# Patient Record
Sex: FEMALE | Race: WHITE | NOT HISPANIC OR LATINO | Employment: FULL TIME | URBAN - NONMETROPOLITAN AREA
[De-identification: names, ages, dates, MRNs, and addresses within clinical notes are randomized per-mention and may not be internally consistent; named-entity substitution may affect disease eponyms.]

---

## 2021-09-08 ENCOUNTER — OFFICE VISIT (OUTPATIENT)
Dept: INTERNAL MEDICINE | Facility: CLINIC | Age: 31
End: 2021-09-08

## 2021-09-08 ENCOUNTER — OFFICE VISIT (OUTPATIENT)
Dept: OBSTETRICS AND GYNECOLOGY | Facility: CLINIC | Age: 31
End: 2021-09-08

## 2021-09-08 VITALS
HEIGHT: 66 IN | BODY MASS INDEX: 24.43 KG/M2 | DIASTOLIC BLOOD PRESSURE: 64 MMHG | WEIGHT: 152 LBS | SYSTOLIC BLOOD PRESSURE: 112 MMHG

## 2021-09-08 VITALS
DIASTOLIC BLOOD PRESSURE: 81 MMHG | WEIGHT: 147 LBS | SYSTOLIC BLOOD PRESSURE: 114 MMHG | HEIGHT: 55 IN | TEMPERATURE: 97.7 F | HEART RATE: 100 BPM | BODY MASS INDEX: 34.02 KG/M2 | OXYGEN SATURATION: 100 %

## 2021-09-08 DIAGNOSIS — L70.0 ACNE VULGARIS: ICD-10-CM

## 2021-09-08 DIAGNOSIS — Z30.8 ENCOUNTER FOR OTHER CONTRACEPTIVE MANAGEMENT: ICD-10-CM

## 2021-09-08 DIAGNOSIS — Z13.0 SCREENING FOR BLOOD DISEASE: ICD-10-CM

## 2021-09-08 DIAGNOSIS — G47.9 SLEEP DISTURBANCE: ICD-10-CM

## 2021-09-08 DIAGNOSIS — Z12.39 ENCOUNTER FOR BREAST CANCER SCREENING OTHER THAN MAMMOGRAM: ICD-10-CM

## 2021-09-08 DIAGNOSIS — Z13.29 SCREENING FOR ENDOCRINE DISORDER: ICD-10-CM

## 2021-09-08 DIAGNOSIS — Z01.419 ENCOUNTER FOR GYNECOLOGICAL EXAMINATION WITHOUT ABNORMAL FINDING: Primary | ICD-10-CM

## 2021-09-08 DIAGNOSIS — R23.2 HOT FLASHES: ICD-10-CM

## 2021-09-08 DIAGNOSIS — L30.9 DERMATITIS: Primary | ICD-10-CM

## 2021-09-08 DIAGNOSIS — Z31.69 ENCOUNTER FOR PRECONCEPTION CONSULTATION: ICD-10-CM

## 2021-09-08 DIAGNOSIS — Z13.220 LIPID SCREENING: ICD-10-CM

## 2021-09-08 PROCEDURE — 99385 PREV VISIT NEW AGE 18-39: CPT | Performed by: OBSTETRICS & GYNECOLOGY

## 2021-09-08 PROCEDURE — 99204 OFFICE O/P NEW MOD 45 MIN: CPT | Performed by: FAMILY MEDICINE

## 2021-09-08 PROCEDURE — 99214 OFFICE O/P EST MOD 30 MIN: CPT | Performed by: OBSTETRICS & GYNECOLOGY

## 2021-09-08 RX ORDER — DOXYCYCLINE HYCLATE 50 MG/1
50 CAPSULE ORAL DAILY
COMMUNITY
Start: 2021-08-17

## 2021-09-08 RX ORDER — NORGESTIMATE AND ETHINYL ESTRADIOL 0.25-0.035
1 KIT ORAL DAILY
COMMUNITY
Start: 2021-08-19 | End: 2021-09-08 | Stop reason: SDUPTHER

## 2021-09-08 NOTE — PROGRESS NOTES
Niyah Santizo is a 31 y.o. female.    Chief Complaint   Patient presents with   • Establish Care     thyroid check       HPI   Patient presents today to establish care.  She has not had a primary care physician in quite some time.  She does see gynecology, who writes her oral contraceptive.  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  Patient reports concern with thyroid disorder.  She reports her mother has thyroid disorder.  She reports hormonal acne and mood has improved over the years.  She reports hot flashes during the day.  She sweats frequently.  Sleeps with a fan on. Denies palpitations randomly, sometimes with anxiety.  Not overly fatigued.  Wanting to come off oral contraceptives.  Going to discuss with gynecology.  Does not sleep throughout the night.  She does get overheated.  She wakes up every couple of hours.  No trouble falling asleep.  She is able to go back to sleep.  This has always been an issue for her.  She reports a skin abnormality off and on.  She states it can be almost like an allergic reaction to chest, backs of knees.  Will occasionally have rash to knuckles, fingers, knees, elbows.  This has occurred since 2017.      Not had tdap in the last 10 years.  Not had COVID vaccine.  Concerned with infertility.  Will have pap later today.     The following portions of the patient's history were reviewed and updated as appropriate: allergies, current medications, past family history, past medical history, past social history, past surgical history and problem list.     Past Medical History:   Diagnosis Date   • Allergic    • Asthma    • Depression        Past Surgical History:   Procedure Laterality Date   • HERNIA REPAIR  1997    inguinal       Family History   Problem Relation Age of Onset   • Hypertension Father    • Arthritis Other    • Thyroid disease Mother         low       Social History     Socioeconomic History   • Marital  status: Single     Spouse name: Not on file   • Number of children: Not on file   • Years of education: Not on file   • Highest education level: Not on file   Tobacco Use   • Smoking status: Former Smoker     Packs/day: 0.50     Years: 5.00     Pack years: 2.50     Types: Cigarettes     Quit date: 2016     Years since quittin.7   • Smokeless tobacco: Never Used   Substance and Sexual Activity   • Alcohol use: Yes     Comment: couple times a month   • Drug use: Yes     Types: Marijuana   • Sexual activity: Yes     Partners: Male       Allergies   Allergen Reactions   • Latex Rash         Current Outpatient Medications:   •  doxycycline (VIBRAMYCIN) 50 MG capsule, Take 50 mg by mouth Daily., Disp: , Rfl:   •  tretinoin (RETIN-A) 0.025 % cream, APPLY CREAM TOPICALLY ONCE DAILY AT BEDTIME TO AFFECTED AREA, Disp: , Rfl:   •  Mono-Linyah 0.25-35 MG-MCG per tablet, Take 1 tablet by mouth Daily., Disp: 28 tablet, Rfl: 11    ROS    Review of Systems   Constitutional: Negative for chills, fatigue and fever.   HENT: Negative for congestion, postnasal drip and sore throat.    Eyes: Negative for blurred vision and visual disturbance.   Respiratory: Negative for cough, shortness of breath and wheezing.    Cardiovascular: Negative for chest pain and leg swelling.   Gastrointestinal: Negative for abdominal pain, constipation, diarrhea, nausea and vomiting.   Endocrine: Positive for heat intolerance. Negative for cold intolerance.   Genitourinary: Negative for dysuria and frequency.   Musculoskeletal: Negative for arthralgias and back pain.   Skin: Positive for rash. Negative for color change.   Allergic/Immunologic: Negative for environmental allergies.   Neurological: Negative for weakness, numbness and headache.   Hematological: Does not bruise/bleed easily.   Psychiatric/Behavioral: Positive for agitation. Negative for depressed mood. The patient is not nervous/anxious.        Vitals:    21 1031   BP: 114/81  "  Pulse: 100   Temp: 97.7 °F (36.5 °C)   SpO2: 100%   Weight: 66.7 kg (147 lb)   Height: 66 cm (25.98\")     Body mass index is 153.08 kg/m².    Physical Exam     Physical Exam  Constitutional:       General: She is not in acute distress.     Appearance: Normal appearance. She is well-developed.   HENT:      Head: Normocephalic and atraumatic.      Right Ear: External ear normal.      Left Ear: External ear normal.   Eyes:      Extraocular Movements: Extraocular movements intact.      Conjunctiva/sclera: Conjunctivae normal.   Cardiovascular:      Rate and Rhythm: Normal rate and regular rhythm.      Heart sounds: No murmur heard.     Pulmonary:      Effort: Pulmonary effort is normal. No respiratory distress.      Breath sounds: Normal breath sounds. No wheezing.   Abdominal:      General: Bowel sounds are normal. There is no distension.      Palpations: Abdomen is soft.      Tenderness: There is no abdominal tenderness.   Musculoskeletal:         General: Normal range of motion.      Cervical back: Normal range of motion and neck supple.   Lymphadenopathy:      Cervical: No cervical adenopathy.   Skin:     General: Skin is warm and dry.      Findings: Rash (Hands currently.  Patient does show several pictures of significant rash to various body parts.) present.   Neurological:      Mental Status: She is alert and oriented to person, place, and time.      Cranial Nerves: No cranial nerve deficit.   Psychiatric:         Mood and Affect: Mood normal.         Behavior: Behavior normal.         Assessment/Plan    Problems Addressed this Visit        Skin    Dermatitis - Primary    Relevant Medications    tretinoin (RETIN-A) 0.025 % cream    Other Relevant Orders    JESSIE With / DsDNA, RNP, Sjogrens A / B, Kearns (Completed)       Sleep    Sleep disturbance       Symptoms and Signs    Hot flashes    Relevant Medications    tretinoin (RETIN-A) 0.025 % cream      Other Visit Diagnoses     Screening for endocrine disorder     "    Relevant Orders    TSH (Completed)    T4, Free (Completed)    Lipid screening        Relevant Orders    Lipid Panel (Completed)    Screening for blood disease        Relevant Orders    CBC & Differential (Completed)    Comprehensive Metabolic Panel (Completed)        Will obtain labs to further evaluate patient's multiple complaints.  Discussed that dermatitis may be secondary to autoimmune related disorder.  However, if labs are unremarkable, patient would benefit from seeing dermatology.  Patient will follow up with gynecology later on this afternoon for oral contraceptive.    No orders of the defined types were placed in this encounter.      No orders of the defined types were placed in this encounter.      Return if symptoms worsen or fail to improve.      Keysha De La Cruz, DO

## 2021-09-09 LAB
ALBUMIN SERPL-MCNC: 4.8 G/DL (ref 3.5–5.2)
ALBUMIN/GLOB SERPL: 2.3 G/DL
ALP SERPL-CCNC: 37 U/L (ref 39–117)
ALT SERPL-CCNC: 16 U/L (ref 1–33)
ANA SER QL: NEGATIVE
AST SERPL-CCNC: 16 U/L (ref 1–32)
BASOPHILS # BLD AUTO: 0.05 10*3/MM3 (ref 0–0.2)
BASOPHILS NFR BLD AUTO: 1.1 % (ref 0–1.5)
BILIRUB SERPL-MCNC: 0.3 MG/DL (ref 0–1.2)
BUN SERPL-MCNC: 11 MG/DL (ref 6–20)
BUN/CREAT SERPL: 15.3 (ref 7–25)
CALCIUM SERPL-MCNC: 10.1 MG/DL (ref 8.6–10.5)
CHLORIDE SERPL-SCNC: 105 MMOL/L (ref 98–107)
CHOLEST SERPL-MCNC: 204 MG/DL (ref 0–200)
CO2 SERPL-SCNC: 25.7 MMOL/L (ref 22–29)
CREAT SERPL-MCNC: 0.72 MG/DL (ref 0.57–1)
EOSINOPHIL # BLD AUTO: 0.1 10*3/MM3 (ref 0–0.4)
EOSINOPHIL NFR BLD AUTO: 2.2 % (ref 0.3–6.2)
ERYTHROCYTE [DISTWIDTH] IN BLOOD BY AUTOMATED COUNT: 11.8 % (ref 12.3–15.4)
GLOBULIN SER CALC-MCNC: 2.1 GM/DL
GLUCOSE SERPL-MCNC: 80 MG/DL (ref 65–99)
HCT VFR BLD AUTO: 41.5 % (ref 34–46.6)
HDLC SERPL-MCNC: 72 MG/DL (ref 40–60)
HGB BLD-MCNC: 13.6 G/DL (ref 12–15.9)
IMM GRANULOCYTES # BLD AUTO: 0.01 10*3/MM3 (ref 0–0.05)
IMM GRANULOCYTES NFR BLD AUTO: 0.2 % (ref 0–0.5)
LDLC SERPL CALC-MCNC: 113 MG/DL (ref 0–100)
LYMPHOCYTES # BLD AUTO: 1.59 10*3/MM3 (ref 0.7–3.1)
LYMPHOCYTES NFR BLD AUTO: 34.6 % (ref 19.6–45.3)
MCH RBC QN AUTO: 29.9 PG (ref 26.6–33)
MCHC RBC AUTO-ENTMCNC: 32.8 G/DL (ref 31.5–35.7)
MCV RBC AUTO: 91.2 FL (ref 79–97)
MONOCYTES # BLD AUTO: 0.45 10*3/MM3 (ref 0.1–0.9)
MONOCYTES NFR BLD AUTO: 9.8 % (ref 5–12)
NEUTROPHILS # BLD AUTO: 2.39 10*3/MM3 (ref 1.7–7)
NEUTROPHILS NFR BLD AUTO: 52.1 % (ref 42.7–76)
NRBC BLD AUTO-RTO: 0 /100 WBC (ref 0–0.2)
PLATELET # BLD AUTO: 207 10*3/MM3 (ref 140–450)
POTASSIUM SERPL-SCNC: 4.4 MMOL/L (ref 3.5–5.2)
PROT SERPL-MCNC: 6.9 G/DL (ref 6–8.5)
RBC # BLD AUTO: 4.55 10*6/MM3 (ref 3.77–5.28)
SODIUM SERPL-SCNC: 142 MMOL/L (ref 136–145)
T4 FREE SERPL-MCNC: 1.09 NG/DL (ref 0.93–1.7)
TRIGL SERPL-MCNC: 110 MG/DL (ref 0–150)
TSH SERPL DL<=0.005 MIU/L-ACNC: 1.64 UIU/ML (ref 0.27–4.2)
VLDLC SERPL CALC-MCNC: 19 MG/DL (ref 5–40)
WBC # BLD AUTO: 4.59 10*3/MM3 (ref 3.4–10.8)

## 2021-09-10 RX ORDER — NORGESTIMATE AND ETHINYL ESTRADIOL 0.25-0.035
1 KIT ORAL DAILY
Qty: 28 TABLET | Refills: 11 | Status: SHIPPED | OUTPATIENT
Start: 2021-09-10 | End: 2022-03-18

## 2021-09-10 NOTE — PROGRESS NOTES
Chief Complaint  Gynecologic Exam (Annual/PAP and discuss coming off of birth control and the effects that would have on her body.)     History of Present Illness:  Patient is 31 y.o. No obstetric history on file. who presents to [facility] here as a new patient for her annual examination.  Patient would also like to discuss contraception as well as conception in the near future.  Patient is concerned that she reports she has been on oral contraceptives since her teens.  Patient is currently on generic Sprintec.  She reports on oral contraceptives her menstrual cycles are regular in nature.  Patient does report her oral contraceptives were changed in the past to a lower dose pill.  Patient reports she had irregular bleeding and had worsening of her acne.  Patient has complaints of acne today.  Patient is on a multivitamin with folate.  The patient reports however she does not want to conceive for approximately 3 more years.  The patient sees Dr. De La Curz for primary care.  The patient reports her Pap smears in the past have been abnormal.  She reports however she has never had to have colposcopy or any procedures.  She was told to repeat the Pap smear in 1 year.  The patient reports she was recently seen by Dr. De La Cruz.  Patient is to have labs as noted.  Patient reports she is to be checked for autoimmune disorders.  The patient has been having fatigue as well as a rash.  The patient is also been having a cold intolerance.    History  Past Medical History:   Diagnosis Date   • Allergic    • Asthma    • Depression      No current outpatient medications on file prior to visit.     No current facility-administered medications on file prior to visit.     Allergies   Allergen Reactions   • Latex Rash     Past Surgical History:   Procedure Laterality Date   • HERNIA REPAIR  1997    inguinal     Family History   Problem Relation Age of Onset   • Hypertension Father    • Arthritis Other    •  "Thyroid disease Mother         low     Social History     Socioeconomic History   • Marital status: Single     Spouse name: Not on file   • Number of children: Not on file   • Years of education: Not on file   • Highest education level: Not on file   Tobacco Use   • Smoking status: Former Smoker     Packs/day: 0.50     Years: 5.00     Pack years: 2.50     Types: Cigarettes     Quit date: 2016     Years since quittin.6   • Smokeless tobacco: Never Used   Substance and Sexual Activity   • Alcohol use: Yes     Comment: couple times a month   • Drug use: Yes     Types: Marijuana   • Sexual activity: Yes     Partners: Male       Physical Examination:  Vital Signs: /64 (BP Location: Right arm, Patient Position: Sitting)   Ht 167.6 cm (66\")   Wt 68.9 kg (152 lb)   BMI 24.53 kg/m²     General Appearance: alert, appears stated age, and cooperative  Breasts: Examined in supine position  Symmetric without masses or skin dimpling  Nipples normal without inversion, lesions or discharge  There are no palpable axillary nodes  Abdomen: no masses, no hepatomegaly, no splenomegaly, soft non-tender, no guarding and no rebound tenderness  Pelvic: Clinical staff was present for exam  External genitalia:  normal appearance of the external genitalia including Bartholin's and Gracey's glands.  :  urethral meatus normal;  Vaginal:  normal pink mucosa without prolapse or lesions.  Cervix:  normal appearance.  Uterus:  normal size, shape and consistency.  Adnexa:  normal bimanual exam of the adnexa.  Pap smear done and specimen sent using Thin-Prep technique    Data Review:  The following data was reviewed by: Frannie Cortes MD on 2021:     Labs:  CBC & Differential (2021 11:57)  Comprehensive Metabolic Panel (2021 11:57)  Lipid Panel (2021 11:57)  TSH (2021 11:57)  T4, Free (2021 11:57)  JESSIE With / DsDNA, RNP, Sjogrens A / B, Kearns (2021 11:57)    Imaging:    Medical " Records:  None    Assessment and Plan   Problem List Items Addressed This Visit     None      Visit Diagnoses     Encounter for gynecological examination without abnormal finding    -  Primary  Pap was done today.  If she does not receive the results of the Pap within 2 weeks  time, she was instructed to call to find out the results.  I explained to Niyah that the recommendations for Pap smear interval in a low risk patient has lengthened to 3 years time if cytology alone normal or  5 years time if both cytology and HPV testing were normal.  I encouraged her to be seen yearly for a full physical exam including breast and pelvic exam even during the off years when PAP's will not be performed.    Relevant Orders    Pap IG, Rfx HPV ASCU    Encounter for breast cancer screening other than mammogram      Niyah was counseled regarding having clinical breast exams and breast self-awareness.  Women aged 29-39 years of age should have clinical breast exams every 1-3 years and yearly aged 40 and older.  The patient was counseled regarding breast self-awareness focusing on having a sense of what is normal for her breasts so that she can tell if there are changes.  Even small changes should be reported to provider.    Encounter for other contraceptive management      Contraceptive counseling was provided.  The various options for contraception was discussed including natural family planning, withdrawal method, barrier methods, spermicides, oral contraception, transdermal patch, vaginal ring, injection, implant, and IUDs.  The risks, complications, failure rates, and benefits of each were discussed.  I discussed with the patient the advantages of oral contraception including a reduced risk of ovarian as well as uterine cancer.  I discussed with the patient she may stay on oral contraceptives until she is ready to conceive.  The patient is not planning on conceiving for several more years.  Prescription is given for oral  contraceptives as noted.  Instructions and precautions have been given.    Relevant Medications    Mono-Linyah 0.25-35 MG-MCG per tablet    Acne vulgaris      Patient with complaints of acne as noted.  I discussed with the patient a change of her oral contraceptives.  Patient declines this at this time.  Patient is uncertain if she will continue with oral contraceptives but desires prescription as noted.    Relevant Medications    Mono-Linyah 0.25-35 MG-MCG per tablet    Encounter for preconception consultation      The patient was informed there are no studies showing decreased fertility after being on oral contraceptives for many years.  I have discussed with the patient the benefits of hormonal contraception including treatment of endometriosis which could affect fertility.  Prescriptions given for oral contraceptives as noted.  Patient has also been informed regarding the need for folate supplementation.  Patient is informed regarding the need for 0.4 mg of folate daily for at least 1 month prior to conception.  Patient has been informed regarding the 70% reduction of neural tube defects.          Follow Up/Instructions:  Follow up as noted.  Patient was given instructions and counseling regarding her condition or for health maintenance advice. Please see specific information pulled into the AVS if appropriate.     Note: Speech recognition transcription software may have been used to dictate portions of this document.  An attempt at proofreading has been made though minor errors in transcription may still be present.    This note was electronically signed.  Frannie Cortes M.D.

## 2021-09-19 PROBLEM — L30.9 DERMATITIS: Status: ACTIVE | Noted: 2021-09-19

## 2021-09-19 PROBLEM — R23.2 HOT FLASHES: Status: ACTIVE | Noted: 2021-09-19

## 2021-09-19 PROBLEM — G47.9 SLEEP DISTURBANCE: Status: ACTIVE | Noted: 2021-09-19

## 2021-09-27 ENCOUNTER — TELEPHONE (OUTPATIENT)
Dept: INTERNAL MEDICINE | Facility: CLINIC | Age: 31
End: 2021-09-27

## 2021-09-27 ENCOUNTER — OFFICE VISIT (OUTPATIENT)
Dept: INTERNAL MEDICINE | Facility: CLINIC | Age: 31
End: 2021-09-27

## 2021-09-27 VITALS
DIASTOLIC BLOOD PRESSURE: 68 MMHG | TEMPERATURE: 97.8 F | SYSTOLIC BLOOD PRESSURE: 100 MMHG | WEIGHT: 146.8 LBS | BODY MASS INDEX: 23.59 KG/M2 | HEIGHT: 66 IN | HEART RATE: 73 BPM | OXYGEN SATURATION: 99 %

## 2021-09-27 DIAGNOSIS — H02.843 SWELLING OF EYELID, RIGHT: ICD-10-CM

## 2021-09-27 DIAGNOSIS — Z01.419 ENCOUNTER FOR GYNECOLOGICAL EXAMINATION WITHOUT ABNORMAL FINDING: ICD-10-CM

## 2021-09-27 DIAGNOSIS — Z31.69 ENCOUNTER FOR PRECONCEPTION CONSULTATION: Primary | ICD-10-CM

## 2021-09-27 DIAGNOSIS — L70.0 ACNE VULGARIS: Primary | ICD-10-CM

## 2021-09-27 PROCEDURE — 99213 OFFICE O/P EST LOW 20 MIN: CPT | Performed by: INTERNAL MEDICINE

## 2021-09-27 RX ORDER — FLUTICASONE PROPIONATE 50 MCG
2 SPRAY, SUSPENSION (ML) NASAL DAILY
Qty: 16 G | Refills: 2 | Status: SHIPPED | OUTPATIENT
Start: 2021-09-27 | End: 2021-10-27

## 2021-09-27 RX ORDER — KETOTIFEN FUMARATE 0.35 MG/ML
1 SOLUTION/ DROPS OPHTHALMIC 2 TIMES DAILY
Qty: 10 ML | Refills: 2 | Status: SHIPPED | OUTPATIENT
Start: 2021-09-27

## 2021-09-27 NOTE — TELEPHONE ENCOUNTER
Caller: Niyah Santizo    Relationship to patient: Self    Best call back number: [phone]    Patient is needing: PATIENT WOULD LIKE A WORK EXCUSE UPLOADED TO [employer] FOR HER FOR THE APPT FROM 09/27

## 2021-09-27 NOTE — TELEPHONE ENCOUNTER
Okay to inform patient I have placed an order for hormone levels.  The patient needs to do this however at the end of her placebo week on her oral contraceptives.

## 2021-09-27 NOTE — PROGRESS NOTES
"Subjective  Niyah Santizo is a 31 y.o. female    HPI coming in for evaluation of recurring right eye upper lid swelling especially when she wakes up in the morning this is ongoing for a few weeks now and occurs 2-3 mornings a week.  There is no discharge or stickiness to her eyelids.  Symptoms seem to resolve by the end of the day.  No new trauma denies visual disturbances or pain.  History of recurring nasal congestion and allergies  The following portions of the patient's history were reviewed and updated as appropriate: allergies, current medications, past family history, past medical history, past social history, past surgical history, and problem list.     Review of Systems   Constitutional: Negative.  Negative for activity change, appetite change, fatigue and fever.   HENT: Positive for congestion. Negative for ear discharge, ear pain and trouble swallowing.    Eyes: Negative for photophobia and visual disturbance.        Upper lid swelling   Respiratory: Negative for cough and shortness of breath.    Cardiovascular: Negative for chest pain and palpitations.   Gastrointestinal: Negative for abdominal distention, abdominal pain, constipation, diarrhea, nausea and vomiting.   Endocrine: Negative.    Genitourinary: Negative for dysuria, hematuria and urgency.   Musculoskeletal: Negative for arthralgias, back pain, joint swelling and myalgias.   Skin: Negative for color change and rash.   Allergic/Immunologic: Negative.    Neurological: Negative for dizziness, weakness, light-headedness and headaches.   Hematological: Negative for adenopathy. Does not bruise/bleed easily.   Psychiatric/Behavioral: Negative for agitation, confusion and dysphoric mood. The patient is not nervous/anxious.        Visit Vitals  /68   Pulse 73   Temp 97.8 °F (36.6 °C) (Infrared)   Ht 167.6 cm (66\")   Wt 66.6 kg (146 lb 12.8 oz)   LMP 09/27/2021 (Exact Date)   SpO2 99%   BMI 23.69 kg/m²       Objective  Physical " Exam  Constitutional:       General: She is not in acute distress.     Appearance: She is well-developed.   HENT:      Nose: Nose normal.   Eyes:      General: No scleral icterus.     Conjunctiva/sclera: Conjunctivae normal.        Comments: Swelling without tenderness or erythema   Neck:      Thyroid: No thyromegaly.      Trachea: No tracheal deviation.   Cardiovascular:      Rate and Rhythm: Normal rate and regular rhythm.      Heart sounds: No murmur heard.   No friction rub.   Pulmonary:      Effort: No respiratory distress.      Breath sounds: No wheezing or rales.   Abdominal:      General: There is no distension.      Palpations: Abdomen is soft. There is no mass.      Tenderness: There is no abdominal tenderness. There is no guarding.   Musculoskeletal:         General: No deformity. Normal range of motion.   Lymphadenopathy:      Cervical: No cervical adenopathy.   Skin:     General: Skin is warm and dry.      Findings: No erythema or rash.   Neurological:      Mental Status: She is alert and oriented to person, place, and time.      Cranial Nerves: No cranial nerve deficit.      Coordination: Coordination normal.      Deep Tendon Reflexes: Reflexes are normal and symmetric.   Psychiatric:         Behavior: Behavior normal.         Thought Content: Thought content normal.         Judgment: Judgment normal.         Diagnoses and all orders for this visit:    Acne vulgaris  -     Ambulatory Referral to Dermatology    Swelling of eyelid, right discussed with ophthalmology.  Will try Alaway eyedrops along with steroid no spray.  Referral to ophthalmology if not better    Other orders  -     ketotifen (Alaway) 0.025 % ophthalmic solution; Administer 1 drop to the right eye 2 (Two) Times a Day.  -     fluticasone (Flonase) 50 MCG/ACT nasal spray; 2 sprays into the nostril(s) as directed by provider Daily for 30 days. Administer 2 sprays in each nostril for each dose.

## 2021-09-27 NOTE — TELEPHONE ENCOUNTER
Patient is wondering if she can have her hormones checked, she came here to talk about coming off the birth control, to hopefully have a baby in the near future, she was wanting to make sure she is not going through early menopause.

## 2021-09-30 ENCOUNTER — LAB (OUTPATIENT)
Dept: LAB | Facility: HOSPITAL | Age: 31
End: 2021-09-30

## 2021-09-30 LAB
ESTRADIOL SERPL HS-MCNC: 64.4 PG/ML
FSH SERPL-ACNC: 8.32 MIU/ML

## 2021-09-30 PROCEDURE — 36415 COLL VENOUS BLD VENIPUNCTURE: CPT | Performed by: OBSTETRICS & GYNECOLOGY

## 2021-09-30 PROCEDURE — 84402 ASSAY OF FREE TESTOSTERONE: CPT | Performed by: OBSTETRICS & GYNECOLOGY

## 2021-09-30 PROCEDURE — 82670 ASSAY OF TOTAL ESTRADIOL: CPT | Performed by: OBSTETRICS & GYNECOLOGY

## 2021-09-30 PROCEDURE — 83001 ASSAY OF GONADOTROPIN (FSH): CPT | Performed by: OBSTETRICS & GYNECOLOGY

## 2021-09-30 PROCEDURE — 84403 ASSAY OF TOTAL TESTOSTERONE: CPT | Performed by: OBSTETRICS & GYNECOLOGY

## 2021-10-13 LAB — REF LAB TEST METHOD: NORMAL

## 2022-03-18 ENCOUNTER — TELEPHONE (OUTPATIENT)
Dept: OBSTETRICS AND GYNECOLOGY | Facility: CLINIC | Age: 32
End: 2022-03-18

## 2022-03-18 RX ORDER — NORGESTIMATE AND ETHINYL ESTRADIOL 0.25-0.035
1 KIT ORAL DAILY
Qty: 84 TABLET | Refills: 3 | Status: SHIPPED | OUTPATIENT
Start: 2022-03-18

## 2022-03-18 NOTE — TELEPHONE ENCOUNTER
[facility] doesn't have Mono-Linyah and Rx is [facility].  They are wondering if she can have Sprintec.  If so, a new script will need sent.

## 2022-03-18 NOTE — TELEPHONE ENCOUNTER
----- Message from Nani Haas MA sent at 3/18/2022  2:39 PM EDT -----  Pharmacy called and states they do not have MONO-LINYAH, they do have the generic. Will need a new RX ( will need to specify GENERIC is ok).      Dr Cortes's Patient      Thank You    [facility]

## 2022-03-18 NOTE — TELEPHONE ENCOUNTER
Patient says she does okay with Sprintec because she has taken it in the past.  She is need refill for this weekend.

## 2022-07-13 ENCOUNTER — TRANSCRIBE ORDERS (OUTPATIENT)
Dept: LAB | Facility: HOSPITAL | Age: 32
End: 2022-07-13

## 2023-04-03 ENCOUNTER — TELEPHONE (OUTPATIENT)
Dept: OBSTETRICS AND GYNECOLOGY | Facility: CLINIC | Age: 33
End: 2023-04-03
Payer: COMMERCIAL

## 2023-04-03 NOTE — TELEPHONE ENCOUNTER
PT IS SCHEDULED TO BE SEEN ON 4/21/23 FOR A POSSIBLE CYST AND IS REQUESTING TO HAVE BLOOD WORK TO HAVE HER THYROID LEVELS CHECKED. PT IS WANTING TO KNOW IF SHE NEEDS TO HAVE AN U/S DONE ON HER APPT, PLEASE ADVISE PT.

## 2023-04-04 ENCOUNTER — TELEPHONE (OUTPATIENT)
Dept: OBSTETRICS AND GYNECOLOGY | Facility: CLINIC | Age: 33
End: 2023-04-04

## 2023-04-04 NOTE — TELEPHONE ENCOUNTER
Patient informed. Patient is having a tugging pain ( left Ovary) and is having painful sex, last period was abnormal, having emotional issues.     Thank You

## 2023-04-21 ENCOUNTER — OFFICE VISIT (OUTPATIENT)
Dept: OBSTETRICS AND GYNECOLOGY | Facility: CLINIC | Age: 33
End: 2023-04-21
Payer: COMMERCIAL

## 2023-04-21 VITALS — DIASTOLIC BLOOD PRESSURE: 68 MMHG | BODY MASS INDEX: 24.37 KG/M2 | WEIGHT: 151 LBS | SYSTOLIC BLOOD PRESSURE: 100 MMHG

## 2023-04-21 DIAGNOSIS — L70.9 ACNE, UNSPECIFIED ACNE TYPE: ICD-10-CM

## 2023-04-21 DIAGNOSIS — N83.201 RIGHT OVARIAN CYST: ICD-10-CM

## 2023-04-21 DIAGNOSIS — R14.0 BLOATING: ICD-10-CM

## 2023-04-21 DIAGNOSIS — N92.6 ABNORMAL MENSES: Primary | ICD-10-CM

## 2023-04-21 DIAGNOSIS — R18.8 FREE FLUID IN PELVIS: ICD-10-CM

## 2023-04-21 PROCEDURE — 99214 OFFICE O/P EST MOD 30 MIN: CPT | Performed by: PHYSICIAN ASSISTANT

## 2023-04-21 RX ORDER — MULTIPLE VITAMINS W/ MINERALS TAB 9MG-400MCG
TAB ORAL
COMMUNITY

## 2023-04-21 NOTE — PROGRESS NOTES
Subjective   Chief Complaint   Patient presents with   • Follow-up     TVS done today       Niyah Santizo is a 33 y.o. year old No obstetric history on file. presenting to be seen for multiple complaints.  Patient is concerned that she has an ovarian cyst.  Patient had been on OCPs for several years however she stopped OCPs 1 year ago as she was concerned about the length of time she had been on OCPs.  Since coming off her OCPs she has had episodes of lower pelvic pain and cramping.  She reports that her periods have been regular and occur the first of each month except for April menses she had spotting for 24 hours only on 4/1/2023.  She has used coitus interruptus only since coming off her OCPs and does not want to go back on hormonal birth control.  Reports along with the lower pelvic pain and cramping she has been feeling bloated and has had episodes of constipation.  She is concerned about thyroid as she has a brother with thyroid issues and states her mother was born without a thyroid gland.  Reports she has had issues with acne for a few years but it is worsened since coming off her OCPs.  She wants all hormone levels checked.  Transvaginal ultrasound done today is reviewed with patient.  Her uterus is anteverted and normal appearing.  Endometrium 10 mm.  Left ovary is normal.  Right ovary has a 3.2 cm simple cyst and a 2.6 cm simple cyst.  There is a large amount of free fluid in the cul-de-sac and adjacent to the right adnexal cyst.  Bilateral ovaries have vascular flow.  Normal Pap smear September 2021      Past Medical History:   Diagnosis Date   • Allergic    • Asthma    • Depression         Current Outpatient Medications:   •  multivitamin with minerals tablet tablet, ONE DAILY WOMENS 50 PLUS TABS, Disp: , Rfl:   •  tretinoin (RETIN-A) 0.025 % cream, APPLY CREAM TOPICALLY ONCE DAILY AT BEDTIME TO AFFECTED AREA, Disp: , Rfl:   •  fluticasone (Flonase) 50 MCG/ACT nasal spray, 2 sprays into the  nostril(s) as directed by provider Daily for 30 days. Administer 2 sprays in each nostril for each dose., Disp: 16 g, Rfl: 2  •  ketotifen (Alaway) 0.025 % ophthalmic solution, Administer 1 drop to the right eye 2 (Two) Times a Day. (Patient not taking: Reported on 2023), Disp: 10 mL, Rfl: 2  •  norgestimate-ethinyl estradiol (ORTHO-CYCLEN) 0.25-35 MG-MCG per tablet, Take 1 tablet by mouth Daily. (Patient not taking: Reported on 2023), Disp: 84 tablet, Rfl: 3   Allergies   Allergen Reactions   • Latex Rash      Past Surgical History:   Procedure Laterality Date   • HERNIA REPAIR      inguinal      Social History     Socioeconomic History   • Marital status: Single   Tobacco Use   • Smoking status: Former     Packs/day: 0.50     Years: 5.00     Pack years: 2.50     Types: Cigarettes     Quit date: 2016     Years since quittin.3   • Smokeless tobacco: Never   Substance and Sexual Activity   • Alcohol use: Yes     Comment: couple times a month   • Drug use: Yes     Types: Marijuana   • Sexual activity: Yes     Partners: Male      Family History   Problem Relation Age of Onset   • Hypertension Father    • Arthritis Other    • Thyroid disease Mother         low       Review of Systems   Constitutional: Negative for chills, diaphoresis and fever.   Gastrointestinal: Positive for abdominal distention and constipation. Negative for nausea and vomiting.   Genitourinary: Positive for menstrual problem and pelvic pain. Negative for difficulty urinating, dysuria and vaginal discharge.           Objective   /68   Wt 68.5 kg (151 lb)   LMP 2023 (Exact Date)   BMI 24.37 kg/m²     Physical Exam  Constitutional:       Appearance: Normal appearance. She is well-developed and well-groomed.   Neurological:      Mental Status: She is alert.   Psychiatric:         Behavior: Behavior is cooperative.            Result Review :           Pap IG, Rfx HPV ASCU (2021)  US Non-ob Transvaginal  (04/21/2023 13:13)          Assessment and Plan  Diagnoses and all orders for this visit:    1. Abnormal menses (Primary)  -     Estradiol  -     Follicle Stimulating Hormone  -     Luteinizing Hormone  -     Progesterone  -     Prolactin  -     Thyroid Panel With TSH  -     Testosterone (Free & Total), LC / MS  -     17-Hydroxyprogesterone  -     DHEA-Sulfate  -     HCG, B-subunit, Quantitative ([facility] Only)    2. Acne, unspecified acne type  -     Estradiol  -     Follicle Stimulating Hormone  -     Luteinizing Hormone  -     Progesterone  -     Prolactin  -     Thyroid Panel With TSH  -     Testosterone (Free & Total), LC / MS  -     17-Hydroxyprogesterone  -     DHEA-Sulfate    3. Right ovarian cyst    4. Free fluid in pelvis    5. Bloating      Patient Instructions   We will check hormone levels today and contact patient with results when available.  We will plan on repeat pelvic ultrasound in 4 to 6 weeks to check ovarian cyst and free fluid.  Patient is advised should she have any increased pain or other concerns to call or return to the office as needed.             This note was electronically signed.    Gisel Martinez PA-C   April 21, 2023

## 2023-04-21 NOTE — PATIENT INSTRUCTIONS
We will check hormone levels today and contact patient with results when available.  We will plan on repeat pelvic ultrasound in 4 to 6 weeks to check ovarian cyst and free fluid.  Patient is advised should she have any increased pain or other concerns to call or return to the office as needed.

## 2023-04-29 LAB
17OHP SERPL-MCNC: 85 NG/DL
DHEA-S SERPL-MCNC: 96.8 UG/DL (ref 84.8–378)
ESTRADIOL SERPL-MCNC: 128 PG/ML
FSH SERPL-ACNC: 1.9 MIU/ML
FT4I SERPL CALC-MCNC: 2 (ref 1.2–4.9)
HCG INTACT+B SERPL-ACNC: <1 MIU/ML
LH SERPL-ACNC: 2.8 MIU/ML
PROGEST SERPL-MCNC: 8.1 NG/ML
PROLACTIN SERPL-MCNC: 15.4 NG/ML (ref 4.8–23.3)
T3RU NFR SERPL: 25 % (ref 24–39)
T4 SERPL-MCNC: 8 UG/DL (ref 4.5–12)
TESTOST FREE SERPL-MCNC: 1 PG/ML (ref 0–4.2)
TESTOST SERPL-MCNC: 17.4 NG/DL (ref 10–55)
TSH SERPL DL<=0.005 MIU/L-ACNC: 2.87 UIU/ML (ref 0.45–4.5)

## 2023-06-16 DIAGNOSIS — N83.9: Primary | ICD-10-CM

## 2023-06-17 ENCOUNTER — LAB (OUTPATIENT)
Dept: LAB | Facility: HOSPITAL | Age: 33
End: 2023-06-17
Payer: COMMERCIAL

## 2023-06-17 DIAGNOSIS — N83.9: ICD-10-CM

## 2023-06-17 LAB — CANCER AG125 SERPL QL: 19.3 U/ML (ref 0–38.1)

## 2023-06-17 PROCEDURE — 86304 IMMUNOASSAY TUMOR CA 125: CPT

## 2023-06-17 PROCEDURE — 36415 COLL VENOUS BLD VENIPUNCTURE: CPT

## 2023-06-20 NOTE — PROGRESS NOTES
Patient informed  in normal range. After reiteration of discussion Friday regarding ultrasound patient would like to schedule appt to discuss possible laparoscopy vs surveillance ultrasound and repeat  with MD and kingsley Nino. Transferred to [facility] to make appt